# Patient Record
Sex: MALE | URBAN - METROPOLITAN AREA
[De-identification: names, ages, dates, MRNs, and addresses within clinical notes are randomized per-mention and may not be internally consistent; named-entity substitution may affect disease eponyms.]

---

## 2017-01-25 ENCOUNTER — APPOINTMENT (RX ONLY)
Dept: URBAN - METROPOLITAN AREA TELEMEDICINE 2 | Facility: TELEMEDICINE | Age: 42
Setting detail: DERMATOLOGY
End: 2017-01-25

## 2017-01-25 VITALS — WEIGHT: 315 LBS | HEIGHT: 72 IN

## 2017-01-25 DIAGNOSIS — N62 HYPERTROPHY OF BREAST: ICD-10-CM

## 2017-01-25 PROBLEM — I10 ESSENTIAL (PRIMARY) HYPERTENSION: Status: ACTIVE | Noted: 2017-01-25

## 2017-01-25 PROCEDURE — 99201: CPT

## 2017-01-25 PROCEDURE — ? COUNSELING - GYNECOMASTIA

## 2017-01-25 PROCEDURE — ? RECOMMENDATIONS

## 2017-01-25 ASSESSMENT — LOCATION SIMPLE DESCRIPTION DERM
LOCATION SIMPLE: RIGHT CHEST
LOCATION SIMPLE: LEFT CHEST
LOCATION SIMPLE: CHEST

## 2017-01-25 ASSESSMENT — LOCATION DETAILED DESCRIPTION DERM
LOCATION DETAILED: LEFT MEDIAL INFERIOR CHEST
LOCATION DETAILED: LEFT NIPPLE
LOCATION DETAILED: RIGHT MEDIAL INFERIOR CHEST
LOCATION DETAILED: RIGHT NIPPLE

## 2017-01-25 ASSESSMENT — LOCATION ZONE DERM: LOCATION ZONE: TRUNK

## 2017-01-25 NOTE — HPI: BREAST (GYNECOMASTIA)
Which Breast(S) Are You Concerned About?: bilateral breasts
How Severe Is The Gynecomastia?: severe
Is This A New Presentation, Or A Follow-Up?: Gynecomastia
Which Side(S)?: both breasts

## 2017-01-25 NOTE — PROCEDURE: RECOMMENDATIONS
Detail Level: Zone
Recommendation Preamble: The following recommendations were made during the visit:
Recommendations (Free Text): 1. Liposuction of gynecomastia and revision of contour deformities of bilateral breasts.\\n\\n-correction if nipple inversion is separate procedure and would not be performed at same time.\\n-advised patient i can improve current aesthetics but completely resolving excess skin, perfect symmetry, and flush chest is not plausible and is unrealistic. \\n-procedure and expectations were discussed at length. All patients questions and concerns were fully addressed. \\n-patient would like to obtain pre-auth for procedure \\n-patient is present today for a second opinion. He has previously seen a plastic surgeon. If patient would like a third opinion, recommend Dr. Varela at [facility]. \\n-Will take photos at next consult if patient wishes to pursue procedure with us. He did not want to provide his insurance card at this visit.
